# Patient Record
Sex: FEMALE
[De-identification: names, ages, dates, MRNs, and addresses within clinical notes are randomized per-mention and may not be internally consistent; named-entity substitution may affect disease eponyms.]

---

## 2018-03-03 ENCOUNTER — HOSPITAL ENCOUNTER (OUTPATIENT)
Dept: HOSPITAL 62 - LAB | Age: 38
End: 2018-03-03
Attending: FAMILY MEDICINE
Payer: COMMERCIAL

## 2018-03-03 DIAGNOSIS — Z13.1: Primary | ICD-10-CM

## 2018-03-03 DIAGNOSIS — Z13.0: ICD-10-CM

## 2018-03-03 LAB
ADD MANUAL DIFF: NO
ANION GAP SERPL CALC-SCNC: 9 MMOL/L (ref 5–19)
BASOPHILS # BLD AUTO: 0 10^3/UL (ref 0–0.2)
BASOPHILS NFR BLD AUTO: 1 % (ref 0–2)
BUN SERPL-MCNC: 13 MG/DL (ref 7–20)
CALCIUM: 9.6 MG/DL (ref 8.4–10.2)
CHLORIDE SERPL-SCNC: 102 MMOL/L (ref 98–107)
CO2 SERPL-SCNC: 28 MMOL/L (ref 22–30)
EOSINOPHIL # BLD AUTO: 0.1 10^3/UL (ref 0–0.6)
EOSINOPHIL NFR BLD AUTO: 1.1 % (ref 0–6)
ERYTHROCYTE [DISTWIDTH] IN BLOOD BY AUTOMATED COUNT: 14 % (ref 11.5–14)
GLUCOSE SERPL-MCNC: 89 MG/DL (ref 75–110)
HCT VFR BLD CALC: 39.7 % (ref 36–47)
HGB BLD-MCNC: 13.1 G/DL (ref 12–15.5)
LYMPHOCYTES # BLD AUTO: 2.1 10^3/UL (ref 0.5–4.7)
LYMPHOCYTES NFR BLD AUTO: 43.7 % (ref 13–45)
MCH RBC QN AUTO: 27.8 PG (ref 27–33.4)
MCHC RBC AUTO-ENTMCNC: 32.9 G/DL (ref 32–36)
MCV RBC AUTO: 84 FL (ref 80–97)
MONOCYTES # BLD AUTO: 0.2 10^3/UL (ref 0.1–1.4)
MONOCYTES NFR BLD AUTO: 5.1 % (ref 3–13)
NEUTROPHILS # BLD AUTO: 2.4 10^3/UL (ref 1.7–8.2)
NEUTS SEG NFR BLD AUTO: 49.1 % (ref 42–78)
PLATELET # BLD: 222 10^3/UL (ref 150–450)
POTASSIUM SERPL-SCNC: 4.7 MMOL/L (ref 3.6–5)
RBC # BLD AUTO: 4.71 10^6/UL (ref 3.72–5.28)
SODIUM SERPL-SCNC: 138.9 MMOL/L (ref 137–145)
TOTAL CELLS COUNTED % (AUTO): 100 %
WBC # BLD AUTO: 4.8 10^3/UL (ref 4–10.5)

## 2018-03-03 PROCEDURE — 80048 BASIC METABOLIC PNL TOTAL CA: CPT

## 2018-03-03 PROCEDURE — 85025 COMPLETE CBC W/AUTO DIFF WBC: CPT

## 2018-03-03 PROCEDURE — 36415 COLL VENOUS BLD VENIPUNCTURE: CPT

## 2018-11-11 ENCOUNTER — HOSPITAL ENCOUNTER (EMERGENCY)
Dept: HOSPITAL 62 - ER | Age: 38
Discharge: HOME | End: 2018-11-11
Payer: SELF-PAY

## 2018-11-11 VITALS — DIASTOLIC BLOOD PRESSURE: 54 MMHG | SYSTOLIC BLOOD PRESSURE: 97 MMHG

## 2018-11-11 DIAGNOSIS — M25.50: ICD-10-CM

## 2018-11-11 DIAGNOSIS — R63.0: ICD-10-CM

## 2018-11-11 DIAGNOSIS — N30.00: Primary | ICD-10-CM

## 2018-11-11 DIAGNOSIS — R11.0: ICD-10-CM

## 2018-11-11 DIAGNOSIS — M79.10: ICD-10-CM

## 2018-11-11 DIAGNOSIS — R50.9: ICD-10-CM

## 2018-11-11 LAB
A TYPE INFLUENZA AG: NEGATIVE
APPEARANCE UR: CLEAR
APTT PPP: (no result) S
B INFLUENZA AG: NEGATIVE
BILIRUB UR QL STRIP: NEGATIVE
GLUCOSE UR STRIP-MCNC: NEGATIVE MG/DL
KETONES UR STRIP-MCNC: NEGATIVE MG/DL
NITRITE UR QL STRIP: NEGATIVE
PH UR STRIP: 6 [PH] (ref 5–9)
PROT UR STRIP-MCNC: NEGATIVE MG/DL
SP GR UR STRIP: 1
UROBILINOGEN UR-MCNC: NEGATIVE MG/DL (ref ?–2)

## 2018-11-11 PROCEDURE — 87804 INFLUENZA ASSAY W/OPTIC: CPT

## 2018-11-11 PROCEDURE — 81001 URINALYSIS AUTO W/SCOPE: CPT

## 2018-11-11 PROCEDURE — 96372 THER/PROPH/DIAG INJ SC/IM: CPT

## 2018-11-11 PROCEDURE — 99283 EMERGENCY DEPT VISIT LOW MDM: CPT

## 2018-11-11 NOTE — ER DOCUMENT REPORT
ED Fever





- General


Chief Complaint: Fever


Stated Complaint: FEVER


Time Seen by Provider: 18 09:28


TRAVEL OUTSIDE OF THE [place] IN LAST 30 DAYS: No





- HPI


Notes: 





Patient is a 38-year-old female that presents to the emergency department for 

chief complaint of fever and myalgia.


Patient reports 4 days of fevers at home.  Earlier today was 101.0.  She has 

not taken any Tylenol or ibuprofen today.  She reports diffuse myalgias and 

pain in her joints with movement.  The pain is achy.  She denies any relieving 

factors to her pain.  She did try taking Mucinex at home because she thought 

she may have the flu with no improvement of symptoms.  She denies cough, 

shortness of breath, chest pain, abdominal pain and diarrhea.  She does report 

decreased appetite and some mild nausea.  Patient also states she is concerned 

that she may have left a tampon in which is causing her infection.  She does 

not remember leaving one in and is denying any vaginal discharge or malodor.  

She is requesting evaluation for retained tampon.  She did not get a flu 

vaccine this year





Past Medical History: Negative





Past Surgical History: 





Social History: Denies drugs.  Denies tobacco.  Occasional alcohol.





Family History: Reviewed and noncontributory for presenting illness





Allergies: Reviewed, see documented allergy list.








REVIEW OF SYSTEMS:





CONSTITUTIONAL : 





 fever





No chills





No diaphoresis





No recent illness





EENT:





No vision changes





No congestion





No sore throat  





CARDIOVASCULAR:





No chest pain





No palpitations





RESPIRATORY:





No shortness of breath





No cough





No difficulty breathing





GASTROINTESTINAL: 





No abdominal pain





 nausea





No vomiting





No diarrhea





GENITOURINARY:





No dysuria





No hematuria





No difficulty urinating





MUSCULOSKELETAL:





Myalgias





No back pain





No leg pain





No arm pain





SKIN:  





No rashes





No lesions





LYMPHATIC: 





No swollen, enlarged glands.





NEUROLOGICAL: 





No lightheadedness





No headache





No weakness





No paresthesias





PSYCHIATRIC:





No anxiety





No depression 








PHYSICAL EXAMINATION:





Vital signs reviewed, nursing noted reviewed.





GENERAL: Well-appearing, well-nourished and in no acute distress.





HEAD: Atraumatic, normocephalic.





EYES: Eyes appear normal, extraocular movements intact, sclera anicteric, 

conjunctiva are normal.





ENT: nares patent, oropharynx clear without exudates.  Moist mucous membranes.





NECK: Normal range of motion, supple without lymphadenopathy





LUNGS: Breath sounds clear to auscultation bilaterally and equal.  No wheezes 

rales or rhonchi.





HEART: Regular rate and rhythm without murmurs





ABDOMEN: Soft, nontender, normoactive bowel sounds.  No rebound, guarding, or 

rigidity. No masses appreciated.





: No retained tampon in vaginal vault on speculum exam.  No vaginal discharge 

or bleeding.  No cervical motion tenderness or adnexal tenderness





EXTREMITIES: Nontender, good range of motion, no pitting or edema. 





NEUROLOGICAL: No focal neurological deficits. Moves all extremities 

spontaneously Motor and sensory grossly intact on exam.





PSYCH: Normal mood, normal affect.





SKIN: Warm, Dry, normal turgor, no rashes or lesions noted on exposed skin











- Related Data


Allergies/Adverse Reactions: 


 





No Known Allergies Allergy (Verified 18 08:40)


 











Past Medical History





- Social History


Smoking Status: Never Smoker


Frequency of alcohol use: Occasional


Family History: Reviewed & Not Pertinent


Patient has suicidal ideation: No


Patient has homicidal ideation: No


Renal/ Medical History: Denies: Hx Peritoneal Dialysis


Past Surgical History: Reports: Hx  Section





Review of Systems





- Review of Systems


Notes: 


Dictated








Physical Exam





- Vital signs


Vitals: 


 











Temp Pulse Resp BP Pulse Ox


 


 99.2 F   106 H  18   108/59 L  97 


 


 18 08:47  18 08:47  18 08:47  18 08:47  18 08:47














- Notes


Notes: 





Dictated





Course





- Re-evaluation


Re-evalutation: 





18 09:54


Vitals reviewed.  Nursing notes reviewed.  Patient given Toradol for 

symptomatic management.


18 11:11


Influenza is negative.  Patient had no retained foreign body in her vagina.  

She does have a urinary tract infection and will be started on antibiotics.  

She will continue taking ibuprofen and Tylenol at home as needed for pain.  She 

will follow with her primary care doctor for reevaluation in a few days.  

Discharged home in stable condition.





Laboratory











  18





  09:40 10:20


 


Urine Color   STRAW


 


Urine Appearance   CLEAR


 


Urine pH   6.0


 


Ur Specific Gravity   1.002


 


Urine Protein   NEGATIVE


 


Urine Glucose (UA)   NEGATIVE


 


Urine Ketones   NEGATIVE


 


Urine Blood   SMALL H


 


Urine Nitrite   NEGATIVE


 


Urine Bilirubin   NEGATIVE


 


Urine Urobilinogen   NEGATIVE


 


Ur Leukocyte Esterase   MODERATE H


 


Urine WBC (Auto)   16


 


Urine RBC (Auto)   0


 


Squamous Epi Cells Auto   <1


 


Urine Mucus (Auto)   RARE


 


Urine Ascorbic Acid   NEGATIVE


 


Influenza A (Rapid)  NEGATIVE 


 


Influenza B (Rapid)  NEGATIVE 














- Vital Signs


Vital signs: 


 











Temp Pulse Resp BP Pulse Ox


 


 99.2 F   106 H  18   108/59 L  97 


 


 18 08:47  18 08:47  18 08:47  18 08:47  18 08:47














- Laboratory


Laboratory results interpreted by me: 


 











  18





  10:20


 


Urine Blood  SMALL H


 


Ur Leukocyte Esterase  MODERATE H














Discharge





- Discharge


Clinical Impression: 


 Febrile illness





UTI (urinary tract infection)


Qualifiers:


 Urinary tract infection type: acute cystitis Hematuria presence: without 

hematuria Qualified Code(s): N30.00 - Acute cystitis without hematuria





Condition: Stable


Disposition: HOME, SELF-CARE


Instructions:  Urinary Tract Infection ([facility])


Additional Instructions: 


Please return to the emergency department if you have any worsening, or concern 

of your symptoms.





Please return to the emergency department if you develop chest pain, difficulty 

breathing, severe abdominal pain, or ongoing vomiting.





Please follow-up with your primary care physician in 2-3 days and any other 

recommended physicians.





If prescribed, take all medications as directed. 





If you have any questions or concerns do not hesitate to return the emergency 

department for evaluation.





Take ibuprofen or Tylenol at home for fevers and muscle aches





Prescriptions: 


Cephalexin Monohydrate [Keflex 500 mg Capsule] 500 mg PO Q6H 7 Days  capsule


Referrals: 


HERBERTH CASILLAS MD [Primary Care Provider] - Follow up in 3-5 days